# Patient Record
Sex: MALE | Race: WHITE | NOT HISPANIC OR LATINO | Employment: FULL TIME | URBAN - METROPOLITAN AREA
[De-identification: names, ages, dates, MRNs, and addresses within clinical notes are randomized per-mention and may not be internally consistent; named-entity substitution may affect disease eponyms.]

---

## 2020-10-30 ENCOUNTER — HOSPITAL ENCOUNTER (EMERGENCY)
Facility: MEDICAL CENTER | Age: 25
End: 2020-10-30
Attending: EMERGENCY MEDICINE
Payer: COMMERCIAL

## 2020-10-30 ENCOUNTER — APPOINTMENT (OUTPATIENT)
Dept: RADIOLOGY | Facility: MEDICAL CENTER | Age: 25
End: 2020-10-30
Attending: EMERGENCY MEDICINE
Payer: COMMERCIAL

## 2020-10-30 VITALS
SYSTOLIC BLOOD PRESSURE: 151 MMHG | DIASTOLIC BLOOD PRESSURE: 84 MMHG | RESPIRATION RATE: 16 BRPM | HEART RATE: 95 BPM | OXYGEN SATURATION: 100 % | TEMPERATURE: 97.8 F | WEIGHT: 155.2 LBS | HEIGHT: 68 IN | BODY MASS INDEX: 23.52 KG/M2

## 2020-10-30 DIAGNOSIS — R07.89 CHEST WALL PAIN: ICD-10-CM

## 2020-10-30 LAB — EKG IMPRESSION: NORMAL

## 2020-10-30 PROCEDURE — 700111 HCHG RX REV CODE 636 W/ 250 OVERRIDE (IP): Performed by: EMERGENCY MEDICINE

## 2020-10-30 PROCEDURE — 93005 ELECTROCARDIOGRAM TRACING: CPT | Performed by: EMERGENCY MEDICINE

## 2020-10-30 PROCEDURE — 71045 X-RAY EXAM CHEST 1 VIEW: CPT

## 2020-10-30 PROCEDURE — 96372 THER/PROPH/DIAG INJ SC/IM: CPT

## 2020-10-30 PROCEDURE — 99283 EMERGENCY DEPT VISIT LOW MDM: CPT

## 2020-10-30 RX ORDER — KETOROLAC TROMETHAMINE 30 MG/ML
30 INJECTION, SOLUTION INTRAMUSCULAR; INTRAVENOUS ONCE
Status: COMPLETED | OUTPATIENT
Start: 2020-10-30 | End: 2020-10-30

## 2020-10-30 RX ADMIN — KETOROLAC TROMETHAMINE 30 MG: 30 INJECTION, SOLUTION INTRAMUSCULAR; INTRAVENOUS at 12:30

## 2020-10-30 SDOH — HEALTH STABILITY: MENTAL HEALTH: HOW OFTEN DO YOU HAVE 6 OR MORE DRINKS ON ONE OCCASION?: NEVER

## 2020-10-30 SDOH — HEALTH STABILITY: MENTAL HEALTH: HOW MANY STANDARD DRINKS CONTAINING ALCOHOL DO YOU HAVE ON A TYPICAL DAY?: 1 OR 2

## 2020-10-30 SDOH — HEALTH STABILITY: MENTAL HEALTH: HOW OFTEN DO YOU HAVE A DRINK CONTAINING ALCOHOL?: 2-3 TIMES A WEEK

## 2020-10-30 NOTE — ED PROVIDER NOTES
ED Provider Note    CHIEF COMPLAINT  Chief Complaint   Patient presents with   • Costochondritis     Pt was seen 6 days ago at an emergency room in [place] and diagnosed with Costochondritis. Pt states he is a  and this is inhibiting his ability to do his job. Pt also complains of a rash over his body, (visible on abd) that he states is worse in the Evening. Pt denies Trauma, SOB.        HPI  Loyd Pepe is a 25 y.o. male who presents to the ER with chest pain.  Right parasternal pain.  Sharp character.  Worse with lifting bending deep breath and cough.  Has had the symptoms for about 6 days.  He was seen at another emergency department.  Had a battery of tests and told that he had costochondritis.  He was not feeling better yet and therefore comes back to the ER.  Denies trauma.  No shortness of breath.  No fevers or recent illness.  He does report that he seems to get a rash on his abdomen and body.  Notices it more in the evening after sitting in his hot tub.  Only other new potential allergen is CBD tincture that he has been taking for the chest wall pain.  No sore throat or leg lip swelling.  No mouth swelling.  No leg swelling, leg pain, distant travel, immobilization or surgery.    Obtained records from [facility].  Patient had troponin normal.  D-dimer negative.  EKG unremarkable.  CBC and CMP unremarkable.    REVIEW OF SYSTEMS  As per HPI, otherwise a 10 point review of systems is negative    PAST MEDICAL HISTORY  Costochondritis    SOCIAL HISTORY  Social History     Tobacco Use   • Smoking status: Never Smoker   • Smokeless tobacco: Current User     Types: Chew   Substance Use Topics   • Alcohol use: Not Currently     Frequency: 2-3 times a week     Drinks per session: 1 or 2     Binge frequency: Never   • Drug use: Yes     Types: Inhaled     Comment: THC daily       SURGICAL HISTORY  History reviewed. No pertinent surgical history.    CURRENT MEDICATIONS  Home Medications      "Reviewed by Zayra Amado R.N. (Registered Nurse) on 10/30/20 at 1246  Med List Status: Complete   Medication Last Dose Status        Patient Anastacio Taking any Medications                       ALLERGIES  No Known Allergies    PHYSICAL EXAM  VITAL SIGNS: /89   Pulse 94   Temp 37.2 °C (98.9 °F) (Temporal)   Resp 16   Ht 1.727 m (5' 8\")   Wt 70.4 kg (155 lb 3.3 oz)   SpO2 98%   BMI 23.60 kg/m²    Constitutional: Awake and alert  HENT:  Atraumatic, Normocephalic.Oropharynx dry mucus membranes, Nose normal inspection.   Eyes: Normal inspection  Neck: Supple  Cardiovascular: Normal heart rate, Normal rhythm.  Symmetric peripheral pulses.   Thorax & Lungs: No respiratory distress, No wheezing, No rales, No rhonchi, right parasternal tenderness.  Abdomen: Bowel sounds normal, soft, non-distended, nontender, no mass  Skin: Warm, Dry, No rash.   Back: No tenderness, No CVA tenderness.   Extremities: No clubbing, cyanosis, edema, no Homans or cords   Neurologic: Grossly normal   Psychiatric: Anxious appearing    RADIOLOGY/PROCEDURES  DX-CHEST-PORTABLE (1 VIEW)   Final Result      No acute cardiopulmonary abnormality.           Imaging is interpreted by radiologist    Labs:  Results for orders placed or performed during the hospital encounter of 10/30/20   EKG (NOW)   Result Value Ref Range    Report       [facility] Emergency Dept.    Test Date:  2020-10-30  Pt Name:    HANNAH YOST               Department: ER  MRN:        [id number]                      Room:       [facility]  Gender:     Male                         Technician: [id number]  :        1995                   Requested By:ANDI VIVAR  Order #:    [id number]                    Reading MD: ANDI VIVAR MD    Measurements  Intervals                                Axis  Rate:       77                           P:          38  GA:         128                          QRS:        -9  QRSD:       88                           T:   "        50  QT:         372  QTc:        421    Interpretive Statements  SINUS RHYTHM  RSR' IN V1 OR V2, PROBABLY NORMAL VARIANT  No previous ECG available for comparison  Electronically Signed On 10- 13:15:57 PDT by ANDI VIVAR MD         Medications   ketorolac (TORADOL) injection 30 mg (30 mg Intramuscular Given 10/30/20 1230)       Heart score low  COURSE & MEDICAL DECISION MAKING  Patient presents with chest wall pain along the costochondral junction on the right.  He had a previous work-up that was reassuring.  I obtained EKG that was unremarkable.  Chest x-ray was negative.  Young healthy male.  No suggestion of cardiopulmonary pathology.  I have advised scheduled NSAIDs for the next week.  Given a note from work.  Advised to return to the ER for difficulty breathing, worsening symptoms, not improving or concern.  Asked to follow-up with a primary provider within 1 week.    FINAL IMPRESSION  1.  Chest pain  2.  Suspected costochondritis      This dictation was created using voice recognition software. The accuracy of the dictation is limited to the abilities of the software.  The nursing notes were reviewed and certain aspects of this information were incorporated into this note.      Electronically signed by: Andi Vivar M.D., 10/30/2020 1:24 PM

## 2020-10-30 NOTE — ED TRIAGE NOTES
"[facility]  Chief Complaint   Patient presents with   • Costochondritis     Pt was seen 6 days ago at an emergency room in [place] and diagnosed with Costochondritis. Pt states he is a  and this is inhibiting his ability to do his job. Pt also complains of a rash over his body, (visible on abd) that he states is worse in the Evening. Pt denies Trauma, SOB.      Pt ambulatory to triage with above complaint.     /89   Pulse 94   Temp 37.2 °C (98.9 °F) (Temporal)   Resp 16   Ht 1.727 m (5' 8\")   Wt 70.4 kg (155 lb 3.3 oz)   SpO2 98%   BMI 23.60 kg/m²     Pt informed of triage process and encouraged to notify staff of any changes or concerns. Pt verbalized understanding of instructions. Apologized for long wait time. Pt placed back in lobby.     "

## 2020-10-30 NOTE — Clinical Note
Loyd Pepe was seen and treated in our emergency department on 10/30/2020.  He may return to work on 11/08/2020.       If you have any questions or concerns, please don't hesitate to call.      Sina Meza M.D.

## 2021-05-10 ENCOUNTER — OFFICE VISIT (OUTPATIENT)
Dept: URGENT CARE | Facility: PHYSICIAN GROUP | Age: 26
End: 2021-05-10
Payer: COMMERCIAL

## 2021-05-10 ENCOUNTER — HOSPITAL ENCOUNTER (OUTPATIENT)
Dept: RADIOLOGY | Facility: MEDICAL CENTER | Age: 26
End: 2021-05-10
Attending: FAMILY MEDICINE
Payer: COMMERCIAL

## 2021-05-10 VITALS
SYSTOLIC BLOOD PRESSURE: 124 MMHG | BODY MASS INDEX: 25.27 KG/M2 | WEIGHT: 161 LBS | DIASTOLIC BLOOD PRESSURE: 58 MMHG | HEART RATE: 67 BPM | HEIGHT: 67 IN | TEMPERATURE: 97.8 F | OXYGEN SATURATION: 99 % | RESPIRATION RATE: 16 BRPM

## 2021-05-10 DIAGNOSIS — R07.0 THROAT PAIN: ICD-10-CM

## 2021-05-10 LAB
INT CON NEG: NEGATIVE
INT CON POS: POSITIVE
S PYO AG THROAT QL: NORMAL

## 2021-05-10 PROCEDURE — 70360 X-RAY EXAM OF NECK: CPT

## 2021-05-10 PROCEDURE — 99203 OFFICE O/P NEW LOW 30 MIN: CPT | Performed by: FAMILY MEDICINE

## 2021-05-10 PROCEDURE — 87880 STREP A ASSAY W/OPTIC: CPT | Performed by: FAMILY MEDICINE

## 2021-05-10 RX ORDER — DICLOFENAC SODIUM 75 MG/1
75 TABLET, DELAYED RELEASE ORAL 2 TIMES DAILY
Qty: 10 TABLET | Refills: 1 | Status: SHIPPED | OUTPATIENT
Start: 2021-05-10 | End: 2021-05-15

## 2021-05-10 ASSESSMENT — ENCOUNTER SYMPTOMS
NECK PAIN: 1
MYALGIAS: 1

## 2021-05-10 NOTE — PROGRESS NOTES
"Subjective:      Loyd Pepe is a 25 y.o. male who presents with Other (pt states he smokes alot of marijuana, he started coughing and felt a pain occur starting inside left neck, pain is now in the right side of neck x 3 weeks)    - This is a pleasant and nontoxic appearing 25 y.o. male with c/o throat pain that started after a bad coughing spell when smoking marijuana 3 wks ago. Slight improved since then but not going away. Minimal pain at rest, just hurts on swallowing. No coughing up blood, no voice change or breathing problems or NVFC      ALLERGIES:  Patient has no known allergies.     PMH:  History reviewed. No pertinent past medical history.     PSH:  History reviewed. No pertinent surgical history.    MEDS:  No current outpatient medications on file.    ** I have documented what I find to be significant in regards to past medical, social, family and surgical history  in my HPI or under PMH/PSH/FH review section, otherwise it is noncontributory **             HPI    Review of Systems   Musculoskeletal: Positive for myalgias and neck pain.   All other systems reviewed and are negative.         Objective:     /58 (BP Location: Left arm, Patient Position: Sitting, BP Cuff Size: Adult)   Pulse 67   Temp 36.6 °C (97.8 °F) (Temporal)   Resp 16   Ht 1.702 m (5' 7\")   Wt 73 kg (161 lb)   SpO2 99%   BMI 25.22 kg/m²      Physical Exam  Vitals and nursing note reviewed.   Constitutional:       General: He is not in acute distress.     Appearance: Normal appearance. He is well-developed.   HENT:      Head: Normocephalic and atraumatic.      Mouth/Throat:      Mouth: Mucous membranes are moist.      Pharynx: Oropharynx is clear. No oropharyngeal exudate or posterior oropharyngeal erythema.   Eyes:      General: No scleral icterus.  Neck:        Comments: No edema, discoloration or obvious masses or tracheal deviation. + mild TTP.   Cardiovascular:      Heart sounds: Normal heart sounds. No " murmur.   Pulmonary:      Effort: Pulmonary effort is normal. No respiratory distress.   Skin:     Coloration: Skin is not jaundiced or pale.   Neurological:      Mental Status: He is alert.      Motor: No abnormal muscle tone.   Psychiatric:         Mood and Affect: Mood normal.         Behavior: Behavior normal.                 Assessment/Plan:          1. Throat pain  POCT Rapid Strep A    DX-NECK FOR SOFT TISSUE    REFERRAL TO ENT       - Dx, plan & d/c instructions discussed w/ patient and/or family members  - Rest, stay hydrated OTC Motrin and/or Tylenol as needed  - E.R. precautions discussed     Follow up with their PCP in 2-3 days, ER if not improving or feeling/getting worse.    Any realistic side effects of medications that may have been given today reviewed.     Patient left in stable condition      reviewed if narcotics given     Please note that this dictation was created using voice recognition software. I have made every reasonable attempt to correct obvious errors, but I expect that there are errors of grammar and possibly content that I did not discover before finalizing the note.

## 2023-12-19 ENCOUNTER — OFFICE VISIT (OUTPATIENT)
Dept: URGENT CARE | Facility: PHYSICIAN GROUP | Age: 28
End: 2023-12-19
Payer: COMMERCIAL

## 2023-12-19 ENCOUNTER — HOSPITAL ENCOUNTER (OUTPATIENT)
Dept: RADIOLOGY | Facility: MEDICAL CENTER | Age: 28
End: 2023-12-19
Payer: COMMERCIAL

## 2023-12-19 VITALS
WEIGHT: 160 LBS | OXYGEN SATURATION: 99 % | TEMPERATURE: 97.8 F | SYSTOLIC BLOOD PRESSURE: 144 MMHG | BODY MASS INDEX: 24.25 KG/M2 | RESPIRATION RATE: 16 BRPM | HEIGHT: 68 IN | HEART RATE: 69 BPM | DIASTOLIC BLOOD PRESSURE: 82 MMHG

## 2023-12-19 DIAGNOSIS — M54.9 UPPER BACK PAIN ON LEFT SIDE: ICD-10-CM

## 2023-12-19 DIAGNOSIS — Z76.89 ENCOUNTER TO ESTABLISH CARE WITH NEW DOCTOR: ICD-10-CM

## 2023-12-19 PROCEDURE — 3077F SYST BP >= 140 MM HG: CPT

## 2023-12-19 PROCEDURE — 99214 OFFICE O/P EST MOD 30 MIN: CPT | Mod: 25

## 2023-12-19 PROCEDURE — 3079F DIAST BP 80-89 MM HG: CPT

## 2023-12-19 PROCEDURE — 71046 X-RAY EXAM CHEST 2 VIEWS: CPT

## 2023-12-19 RX ORDER — IBUPROFEN 200 MG
200 TABLET ORAL EVERY 6 HOURS PRN
COMMUNITY

## 2023-12-19 RX ORDER — CYCLOBENZAPRINE HCL 5 MG
5-10 TABLET ORAL
Qty: 15 TABLET | Refills: 0 | Status: SHIPPED | OUTPATIENT
Start: 2023-12-19

## 2023-12-19 RX ORDER — KETOROLAC TROMETHAMINE 30 MG/ML
15 INJECTION, SOLUTION INTRAMUSCULAR; INTRAVENOUS ONCE
Status: COMPLETED | OUTPATIENT
Start: 2023-12-19 | End: 2023-12-19

## 2023-12-19 RX ADMIN — KETOROLAC TROMETHAMINE 15 MG: 30 INJECTION, SOLUTION INTRAMUSCULAR; INTRAVENOUS at 14:26

## 2023-12-19 NOTE — LETTER
December 19, 2023    To Whom It May Concern:         This is confirmation that Loyd Paco Pepe attended his scheduled appointment with SAMARIA Wilson on 12/19/23. May return 12/23         If you have any questions please do not hesitate to call me at the phone number listed below.    Sincerely,          MICKEY Wilson.  [phone]

## 2023-12-19 NOTE — PROGRESS NOTES
"Chief Complaint   Patient presents with    Other     States that he has costochondritis and now the pain is in his upper back.           Subjective:   HISTORY OF PRESENT ILLNESS: Loyd Pepe is a 28 y.o. male who presents for upper back pain x 4 days, he states that he has had costochondritis in the past and he felt some anterior chest pain about 10 days ago and then he was at work and was stretching his back and felt a pop and now has significant upper left sided back pain.  It hurts to take a deep breath.  The pain is focal and he can pinpoint it for me, it is tender to the touch on the upper left side of his thoracic spine, there is no bony tenderness.    Patient denies SOB, dizziness, he has had no fevers, no unilateral calf pain or swelling He denies any long trips or immobilization , .  He is chest pain free at this time.     Medications, Allergies, current problem list, Social and Family history reviewed today in Epic.     Objective:     BP (!) 144/82 (BP Location: Left arm, Patient Position: Sitting, BP Cuff Size: Adult long)   Pulse 69   Temp 36.6 °C (97.8 °F) (Temporal)   Resp 16   Ht 1.727 m (5' 8\")   Wt 72.6 kg (160 lb)   SpO2 99%     Physical Exam  Vitals reviewed.   Constitutional:       Appearance: Normal appearance.   HENT:      Mouth/Throat:      Mouth: Mucous membranes are moist.   Cardiovascular:      Rate and Rhythm: Normal rate.   Pulmonary:      Effort: Pulmonary effort is normal.   Musculoskeletal:        Arms:       Comments: TTP with muscle tightness noted on exam.    Skin:     General: Skin is warm and dry.   Neurological:      Mental Status: He is alert and oriented to person, place, and time.   Psychiatric:         Mood and Affect: Mood normal.          Assessment/Plan:     Diagnosis and associated orders    I personally reviewed prior external notes and test results pertinent to today's visit.     1. Upper back pain on left side  ketorolac (Toradol) injection 15 mg    " cyclobenzaprine (FLEXERIL) 5 mg tablet    DX-CHEST-2 VIEWS        Today's radiology imaging personally reviewed by me today on day of visit and Radiology readings reviewed and discussed w/ patient today.     RADIOLOGY RESULTS   DX-CHEST-2 VIEWS    Result Date: 12/19/2023 12/19/2023 2:28 PM HISTORY/REASON FOR EXAM:  Shortness of Breath. TECHNIQUE/EXAM DESCRIPTION AND NUMBER OF VIEWS: Two views of the chest. COMPARISON:  10/30/2020 FINDINGS: The heart is normal in size. No pulmonary infiltrates or consolidations are noted. No pleural effusions are appreciated.     No active disease.             IMPRESSION:  Exam findings reassuring with stable vital signs, No red flag symptoms or exam findings. I did review his last 2 ER encounters that discussed his chest wall pain, he had an extensive cardiac workup at hat time, he is chest pain free at this time.  Chest xray obtained to r/o spontaneous pneumothorax or a pneumonia that could be the cause of his pain.  This is negative. He is PERC negative.  This is likely MSk related.  He is given a dose of toradol and flexeril and a work note.  He is to go to the ED for any worsening pain, SOB or new symptoms    Differential diagnosis discussed. Pt was Educated on red flag symptoms. Pt has been Instructed to return to Urgent Care or nearest Emergency Department if symptoms fail to improve, for any change in condition, further concerns, or new concerning symptoms. Patient states understanding of the plan of care and discharge instructions.  They are discharged in stable condition.         Please note that this dictation was created using voice recognition software. I have made a reasonable attempt to correct obvious errors, but I expect that there are errors of grammar and possibly content that I did not discover before finalizing the note.    This note was electronically signed by SAMARIA Wilson

## 2023-12-21 ENCOUNTER — OFFICE VISIT (OUTPATIENT)
Dept: URGENT CARE | Facility: PHYSICIAN GROUP | Age: 28
End: 2023-12-21
Payer: COMMERCIAL

## 2023-12-21 VITALS
WEIGHT: 165 LBS | OXYGEN SATURATION: 99 % | BODY MASS INDEX: 25.01 KG/M2 | SYSTOLIC BLOOD PRESSURE: 104 MMHG | HEIGHT: 68 IN | DIASTOLIC BLOOD PRESSURE: 62 MMHG | RESPIRATION RATE: 16 BRPM | TEMPERATURE: 97.7 F | HEART RATE: 70 BPM

## 2023-12-21 DIAGNOSIS — M54.6 ACUTE LEFT-SIDED THORACIC BACK PAIN: ICD-10-CM

## 2023-12-21 PROCEDURE — 3074F SYST BP LT 130 MM HG: CPT | Performed by: NURSE PRACTITIONER

## 2023-12-21 PROCEDURE — 3078F DIAST BP <80 MM HG: CPT | Performed by: NURSE PRACTITIONER

## 2023-12-21 PROCEDURE — 99213 OFFICE O/P EST LOW 20 MIN: CPT | Performed by: NURSE PRACTITIONER

## 2023-12-21 ASSESSMENT — ENCOUNTER SYMPTOMS
BACK PAIN: 1
VOMITING: 0
PALPITATIONS: 0
SHORTNESS OF BREATH: 0
FEVER: 0
COUGH: 0
HEMOPTYSIS: 0

## 2023-12-21 NOTE — LETTER
December 21, 2023         Patient: Loyd Pepe   YOB: 1995   Date of Visit: 12/21/2023           To Whom it May Concern:    Loyd Pepe was seen in my clinic on 12/21/2023. He may return to work on 12/26/2023.    If you have any questions or concerns, please don't hesitate to call.        Sincerely,           MICKEY Higgins.  Electronically Signed

## 2023-12-21 NOTE — PROGRESS NOTES
Subjective:     Loyd Pepe is a 28 y.o. male who presents for Back Pain (Upper Back pain fv. Pt is a  for his job and is requesting a doctor's note to have more time off work to recover. Notes the pain has improved)      Presents for follow up in thoracic back pain. Is requesting additional time to rest, as he does not feel he's able to return to work. States he's scheduled to work Saturay, then back on Tuesday. Initially had muscle spasms.     Back Pain  The current episode started in the past 7 days. The problem has been gradually improving since onset. The pain is present in the thoracic spine. The pain is moderate. The symptoms are aggravated by bending and position. Pertinent negatives include no fever.       History reviewed. No pertinent past medical history.    History reviewed. No pertinent surgical history.    Social History     Socioeconomic History   • Marital status: Single     Spouse name: Not on file   • Number of children: Not on file   • Years of education: Not on file   • Highest education level: Not on file   Occupational History   • Not on file   Tobacco Use   • Smoking status: Never   • Smokeless tobacco: Former     Types: Chew   Vaping Use   • Vaping Use: Never used   Substance and Sexual Activity   • Alcohol use: Yes     Comment: Weekends   • Drug use: Yes     Types: Inhaled, Marijuana     Comment: On weekends   • Sexual activity: Not on file   Other Topics Concern   • Not on file   Social History Narrative   • Not on file     Social Determinants of Health     Financial Resource Strain: Not on file   Food Insecurity: Not on file   Transportation Needs: Not on file   Physical Activity: Not on file   Stress: Not on file   Social Connections: Not on file   Intimate Partner Violence: Not on file   Housing Stability: Not on file        History reviewed. No pertinent family history.     No Known Allergies    Review of Systems   Constitutional:  Negative for fever.  "  Respiratory:  Negative for cough, hemoptysis and shortness of breath.    Cardiovascular:  Negative for palpitations.   Gastrointestinal:  Negative for vomiting.   Musculoskeletal:  Positive for back pain.   All other systems reviewed and are negative.       Objective:   /62 (BP Location: Left arm, Patient Position: Sitting, BP Cuff Size: Adult)   Pulse 70   Temp 36.5 °C (97.7 °F) (Temporal)   Resp 16   Ht 1.727 m (5' 8\")   Wt 74.8 kg (165 lb)   SpO2 99%   BMI 25.09 kg/m²     Physical Exam  Vitals reviewed.   Constitutional:       General: He is not in acute distress.     Appearance: He is not toxic-appearing.   Cardiovascular:      Rate and Rhythm: Normal rate.   Pulmonary:      Effort: Pulmonary effort is normal. No respiratory distress.      Breath sounds: Normal breath sounds.   Musculoskeletal:         General: Tenderness present.      Thoracic back: Spasms and tenderness present.      Comments: Left paraspinal thoracic TTP .  equal bilaterally. Guarded to ROM due to increase in pain. No rash noted.    Skin:     General: Skin is warm and dry.      Findings: No rash.   Neurological:      Mental Status: He is alert and oriented to person, place, and time.       Assessment/Plan:   1. Acute left-sided thoracic back pain  -Continue to take medication as prescribed.   -Can also take OTC Ibuprofen and Tylenol as directed for pain.   -Temperature Therapy: Heat or ice, whichever feels better.  -Gentle ROM back stretches and exercises.   -Resume activity as tolerated.    Follow up with your primary care doctor. Follow up for persistent, new, or worsening pain. Emergently for weakness, shortness of breath, palpitation, dizziness, numbness, or fevers.    -No sensory changes or weakness in extremities.  Pain reproducible to ROM and palpation. Review previous imaging, negative CXR. Discussed sedative effects of muscle relaxers. Provided work note.     Differential diagnosis, natural history, supportive " care, and indications for immediate follow-up discussed.

## 2023-12-21 NOTE — PATIENT INSTRUCTIONS
-Continue to take medication as prescribed.   -Can also take OTC Ibuprofen and Tylenol as directed for pain.   -Temperature Therapy: Heat or ice, whichever feels better.  -Gentle ROM back stretches and exercises.   -Resume activity as tolerated.    Follow up with your primary care doctor. Follow up for persistent, new, or worsening pain. Emergently for weakness, shortness of breath, palpitation, dizziness, numbness, or fevers.

## 2024-01-04 ENCOUNTER — TELEPHONE (OUTPATIENT)
Dept: SCHEDULING | Facility: IMAGING CENTER | Age: 29
End: 2024-01-04
Payer: COMMERCIAL